# Patient Record
Sex: MALE | Race: WHITE | NOT HISPANIC OR LATINO | Employment: FULL TIME | URBAN - METROPOLITAN AREA
[De-identification: names, ages, dates, MRNs, and addresses within clinical notes are randomized per-mention and may not be internally consistent; named-entity substitution may affect disease eponyms.]

---

## 2022-06-10 ENCOUNTER — TRANSFERRED RECORDS (OUTPATIENT)
Dept: HEALTH INFORMATION MANAGEMENT | Facility: CLINIC | Age: 37
End: 2022-06-10

## 2022-06-28 ENCOUNTER — TRANSFERRED RECORDS (OUTPATIENT)
Dept: HEALTH INFORMATION MANAGEMENT | Facility: CLINIC | Age: 37
End: 2022-06-28

## 2022-06-30 ENCOUNTER — TRANSCRIBE ORDERS (OUTPATIENT)
Dept: OTHER | Age: 37
End: 2022-06-30

## 2022-06-30 DIAGNOSIS — M79.652 MUSCULOSKELETAL THIGH PAIN, LEFT: Primary | ICD-10-CM

## 2022-06-30 DIAGNOSIS — M25.562 LEFT KNEE PAIN: ICD-10-CM

## 2022-07-01 NOTE — TELEPHONE ENCOUNTER
Action July 8, 2022 4:37 PM MT   Action Taken CSS recvd img disc form [facility] and sent to [email or web address] for upload!     Action    Action Taken 7/1/2022 7:20AM KE[facility]     I called 's [facility] phone number and they looked up the pt. They tried to look up the pt but no luck.    I called [facility] Phone: [phone]- they open at 8am and close at 1pm on Fridays.        DIAGNOSIS: mass on left femur   APPOINTMENT DATE: 7/14/2022     NOTES STATUS DETAILS   OFFICE NOTE from referring provider Kay Martin, [facility]    OFFICE NOTE from other specialist [facility] 06/10/2022 - Geoff Cole DO - [facility]    MEDICATION LIST [facility]    LABS     MRI PACS 06/28/2022 - [facility]   XRAYS (IMAGES & REPORTS) PACS 06/10/2022 - [facility]

## 2022-07-11 DIAGNOSIS — R22.42 MASS OF LEFT THIGH: Primary | ICD-10-CM

## 2022-07-14 ENCOUNTER — DOCUMENTATION ONLY (OUTPATIENT)
Dept: ORTHOPEDICS | Facility: CLINIC | Age: 37
End: 2022-07-14

## 2022-07-14 ENCOUNTER — PRE VISIT (OUTPATIENT)
Dept: ORTHOPEDICS | Facility: CLINIC | Age: 37
End: 2022-07-14

## 2022-07-14 ENCOUNTER — OFFICE VISIT (OUTPATIENT)
Dept: ORTHOPEDICS | Facility: CLINIC | Age: 37
End: 2022-07-14
Payer: COMMERCIAL

## 2022-07-14 VITALS — BODY MASS INDEX: 23.49 KG/M2 | HEIGHT: 68 IN | WEIGHT: 155 LBS

## 2022-07-14 DIAGNOSIS — M79.89 SOFT TISSUE MASS: Primary | ICD-10-CM

## 2022-07-14 DIAGNOSIS — M79.652 MUSCULOSKELETAL THIGH PAIN, LEFT: ICD-10-CM

## 2022-07-14 PROCEDURE — 99204 OFFICE O/P NEW MOD 45 MIN: CPT | Performed by: ORTHOPAEDIC SURGERY

## 2022-07-14 RX ORDER — WARFARIN SODIUM 5 MG/1
TABLET ORAL
COMMUNITY
Start: 2022-05-13

## 2022-07-14 RX ORDER — MONTELUKAST SODIUM 10 MG/1
TABLET ORAL
COMMUNITY
Start: 2022-06-15

## 2022-07-14 NOTE — LETTER
7/14/2022         RE: Vlad Calvin  [address]   [place] [address]        Dear Colleague,    Thank you for referring your patient, Vlad Calvin, to the [facility] [place]. Please see a copy of my visit note below.        [facility], Orthopaedic Oncology Surgery Consultation  by Arsh Aguilar M.D.    Vlad Calvin MRN# [id number]    YOB: 1985     Requesting physician: Rian Lam MD, [facility]  Physicians, [facility]            Assessment and Plan:   Assessment:  Soft tissue mass in the quadriceps of left thigh, deep.  Differential diagnosis includes posttraumatic hematoma in setting of patient with chronic anticoagulation and therefore bleeding diathesis, or possible underlying neoplasm.       Plan:  Given the MRI appearance and the fact that his symptoms are improving, and he has definite evidence of ecchymosis, I have recommended that we defer incurring the morbidity of performing a biopsy (which would require interruption of anticoagulation) and instead repeat his MRI imaging study in 1 months time.  This can be done locally with virtual visit follow-up.    I also plan on conferring with our musculoskeletal radiology team about their interpretation of his current MRI study.           History of Present Illness:   37 year old male  chief complaint    L thigh pain x 4-5 weeks.  Thought he tore a muscle, no injury other than slipping on stairs, had swelling, and ecchymosis noted afterwards.  Was given po steroids x 5d.  Reduced his activity.  No better after several weeks and therefore MRI done which showed a soft tissue mass.    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 AVR mechanical in 2013, on warfarin target INR 3-3.5    Also s/p R shoulder dislocation x 1 requiring surgical reconstruction in 3/2021.    Current symptoms:  Problem: Left  "Femur Mass  Onset and duration: 4-5 weeks   Awakens from sleep due to sx's:  No  Precipitating Injury:  No    Other joints or sites painful:  Yes  Fever: No  Appetite change or weight loss: Yes  History of prior or existing cancer: No    Background history:  DX:  1. Bryanna-Danlos Syndrome   2. S/p AVR, mechanical   3. Long term anticoag - INR 2-2.5, warfarin    TREATMENTS:  1. 1987 [facility] OR   2. 1989  3. 2013             Physical Exam:     EXAMINATION pertinent findings:   PSYCH: Pleasant, healthy-appearing, alert, oriented x3, cooperative. Normal mood and affect.  VITAL SIGNS: Height 1.727 m (5' 8\"), weight 70.3 kg (155 lb)..  Reviewed nursing intake notes.   Body mass index is 23.57 kg/m .  RESP: non labored breathing   ABD: benign, soft, non-tender, no acute peritoneal findings  SKIN: grossly normal   LYMPHATIC: grossly normal, no adenopathy, no extremity edema  NEURO: grossly normal , no motor deficits  VASCULAR: satisfactory perfusion of all extremities   MUSCULOSKELETAL:   Normal gait.  Left hip has 0 to 120 degrees flexion as opposed to the right hip which has 0-135 to flexion.  Otherwise rotation, abduction and adduction are symmetric between both hips.    Both knees have full arc of motion 0 to 135 degrees with no evidence of effusion or ligamentous laxity.    Evidence of ecchymosis involving the pretibial and anterolateral aspect of his leg below the knee.    Slight fullness in the mid thigh but no palpable discrete mass.             Data:   All laboratory data reviewed  All imaging studies reviewed by me    MRI studies reviewed demonstrating deep within the quadriceps, maximum 9 cm dimension, with variable signal demonstrating heterogeneity.    DATA for DOCUMENTATION:         Past Medical History:   There is no problem list on file for this patient.    No past medical history on file.    Also see scanned health assessment forms.       Past Surgical History:   No past surgical history " on file.         Social History:     Social History     Socioeconomic History     Marital status: Single     Spouse name: Not on file     Number of children: Not on file     Years of education: Not on file     Highest education level: Not on file   Occupational History     Not on file   Tobacco Use     Smoking status: Not on file     Smokeless tobacco: Not on file   Substance and Sexual Activity     Alcohol use: Not on file     Drug use: Not on file     Sexual activity: Not on file   Other Topics Concern     Not on file   Social History Narrative     Not on file     Social Determinants of Health     Financial Resource Strain: Not on file   Food Insecurity: Not on file   Transportation Needs: Not on file   Physical Activity: Not on file   Stress: Not on file   Social Connections: Not on file   Intimate Partner Violence: Not on file   Housing Stability: Not on file            Family History:     No family history on file.         Medications:     Current Outpatient Medications   Medication Sig     montelukast (SINGULAIR) 10 MG tablet      warfarin ANTICOAGULANT (COUMADIN) 5 MG tablet      No current facility-administered medications for this visit.              Review of Systems:   A comprehensive 10 point review of systems (constitutional, ENT, cardiac, peripheral vascular, lymphatic, respiratory, GI, , Musculoskeletal, skin, Neurological) was performed and found to be negative except as described in this note.     See intake form completed by patient      Arhs Aguilar MD

## 2022-07-14 NOTE — PROGRESS NOTES
Faxed MRI order to [facility] in [facility]. Faxed to [phone] & [phone]. rightfax marked as sent/recieved.     Tina Reinoso ATC

## 2022-07-14 NOTE — PROGRESS NOTES
[facility] Physicians, Orthopaedic Oncology Surgery Consultation  by Arsh Aguilar M.D.    Vlad Calvin MRN# [id number]    YOB: 1985     Requesting physician: Rian Lam MD, [facility]  Physicians, [facility]            Assessment and Plan:   Assessment:  Soft tissue mass in the quadriceps of left thigh, deep.  Differential diagnosis includes posttraumatic hematoma in setting of patient with chronic anticoagulation and therefore bleeding diathesis, or possible underlying neoplasm.       Plan:  Given the MRI appearance and the fact that his symptoms are improving, and he has definite evidence of ecchymosis, I have recommended that we defer incurring the morbidity of performing a biopsy (which would require interruption of anticoagulation) and instead repeat his MRI imaging study in 1 months time.  This can be done locally with virtual visit follow-up.    I also plan on conferring with our musculoskeletal radiology team about their interpretation of his current MRI study.           History of Present Illness:   37 year old male  chief complaint    L thigh pain x 4-5 weeks.  Thought he tore a muscle, no injury other than slipping on stairs, had swelling, and ecchymosis noted afterwards.  Was given po steroids x 5d.  Reduced his activity.  No better after several weeks and therefore MRI done which showed a soft tissue mass.    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 AVR mechanical in 2013, on warfarin target INR 3-3.5    Also s/p R shoulder dislocation x 1 requiring surgical reconstruction in 3/2021.    Current symptoms:  Problem: Left Femur Mass  Onset and duration: 4-5 weeks   Awakens from sleep due to sx's:  No  Precipitating Injury:  No    Other joints or sites painful:  Yes  Fever: No  Appetite change or weight loss: Yes  History of prior or existing cancer: No    Background  "history:  DX:  1. Bryanna-Danlos Syndrome   2. S/p AVR, mechanical   3. Long term anticoag - INR 2-2.5, warfarin    TREATMENTS:  1. 1987 [facility] OR   2. 1989  3. 2013             Physical Exam:     EXAMINATION pertinent findings:   PSYCH: Pleasant, healthy-appearing, alert, oriented x3, cooperative. Normal mood and affect.  VITAL SIGNS: Height 1.727 m (5' 8\"), weight 70.3 kg (155 lb)..  Reviewed nursing intake notes.   Body mass index is 23.57 kg/m .  RESP: non labored breathing   ABD: benign, soft, non-tender, no acute peritoneal findings  SKIN: grossly normal   LYMPHATIC: grossly normal, no adenopathy, no extremity edema  NEURO: grossly normal , no motor deficits  VASCULAR: satisfactory perfusion of all extremities   MUSCULOSKELETAL:   Normal gait.  Left hip has 0 to 120 degrees flexion as opposed to the right hip which has 0-135 to flexion.  Otherwise rotation, abduction and adduction are symmetric between both hips.    Both knees have full arc of motion 0 to 135 degrees with no evidence of effusion or ligamentous laxity.    Evidence of ecchymosis involving the pretibial and anterolateral aspect of his leg below the knee.    Slight fullness in the mid thigh but no palpable discrete mass.             Data:   All laboratory data reviewed  All imaging studies reviewed by me    MRI studies reviewed demonstrating deep within the quadriceps, maximum 9 cm dimension, with variable signal demonstrating heterogeneity.    DATA for DOCUMENTATION:         Past Medical History:   There is no problem list on file for this patient.    No past medical history on file.    Also see scanned health assessment forms.       Past Surgical History:   No past surgical history on file.         Social History:     Social History     Socioeconomic History     Marital status: Single     Spouse name: Not on file     Number of children: Not on file     Years of education: Not on file     Highest education level: Not on file "   Occupational History     Not on file   Tobacco Use     Smoking status: Not on file     Smokeless tobacco: Not on file   Substance and Sexual Activity     Alcohol use: Not on file     Drug use: Not on file     Sexual activity: Not on file   Other Topics Concern     Not on file   Social History Narrative     Not on file     Social Determinants of Health     Financial Resource Strain: Not on file   Food Insecurity: Not on file   Transportation Needs: Not on file   Physical Activity: Not on file   Stress: Not on file   Social Connections: Not on file   Intimate Partner Violence: Not on file   Housing Stability: Not on file            Family History:     No family history on file.         Medications:     Current Outpatient Medications   Medication Sig     montelukast (SINGULAIR) 10 MG tablet      warfarin ANTICOAGULANT (COUMADIN) 5 MG tablet      No current facility-administered medications for this visit.              Review of Systems:   A comprehensive 10 point review of systems (constitutional, ENT, cardiac, peripheral vascular, lymphatic, respiratory, GI, , Musculoskeletal, skin, Neurological) was performed and found to be negative except as described in this note.     See intake form completed by patient

## 2022-07-14 NOTE — LETTER
Return to Work  2022     Seen today: yes    Patient:  Vlad Calvin  :   1985  MRN:     [id number]  Physician: DEBORAH FINNEGAN    Vlad Calvin may return to work on Date: 7/15/2022.      Patient limitations:    Patient has a mass in his thigh causing pain and will only be able perform light duty work functions for period of time until his symptoms resolve.  When his pain resolves, he may resume full-time work as tolerated.        Electronically signed by Deborah Finnegan MD

## 2022-08-15 ENCOUNTER — TRANSFERRED RECORDS (OUTPATIENT)
Dept: HEALTH INFORMATION MANAGEMENT | Facility: CLINIC | Age: 37
End: 2022-08-15

## 2022-09-01 ENCOUNTER — VIRTUAL VISIT (OUTPATIENT)
Dept: ORTHOPEDICS | Facility: CLINIC | Age: 37
End: 2022-09-01
Payer: COMMERCIAL

## 2022-09-01 DIAGNOSIS — S80.12XS: Primary | ICD-10-CM

## 2022-09-01 PROCEDURE — 99212 OFFICE O/P EST SF 10 MIN: CPT | Mod: 95 | Performed by: ORTHOPAEDIC SURGERY

## 2022-09-01 NOTE — PROGRESS NOTES
[facility], Orthopaedic Oncology Surgery Consultation  by Arsh Aguilar M.D.    Vlad Calvin MRN# [id number]    YOB: 1985     Requesting physician: Rian Lam MD, [facility]  [facility]icians, [facility]            Assessment and Plan:   Assessment:  Soft tissue mass, resolving, consistent with posttraumatic hematoma, in the quadriceps of left thigh, deep.    Patient may return to work without restriction.      Plan:  No further evaluation, surveillance or treatment intervention required.  Advised patient to be mindful of his anticoagulation level to be sure it does not become excessive.  Follow-up on an as-needed basis only.    Arsh Aguilar MD  [facility] Professor  Oncology and Adult Reconstructive Surgery  Dept Orthopaedic Surgery, [facility]  [phone] office, [phone] pager  [email or web address]    Total combined visit time and work time before and after clinic visit = 20 min            History of Present Illness:   37 year old male  chief complaint    Patient seen for follow-up evaluation to review MRI examination after initially being seen previously for a swollen mass within his left thigh.  Patient notes that he still has some swelling remaining but it is improved markedly.  He is walking normally without limp.  He is not taking any pain medication.  He feels that he can return to work on a full-time basis without problem.    Background history:  L thigh pain x 4-5 weeks.  Thought he tore a muscle, no injury other than slipping on stairs, had swelling, and ecchymosis noted afterwards.  Was given po steroids x 5d.  Reduced his activity.  No better after several weeks and therefore MRI done which showed a soft tissue mass.    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 AVR mechanical in 2013, on warfarin target INR 3-3.5    Also s/p R shoulder dislocation x 1  requiring surgical reconstruction in 3/2021.    Current symptoms:  Problem: Left Femur Mass  Onset and duration: 4-5 weeks   Awakens from sleep due to sx's:  No  Precipitating Injury:  No    Other joints or sites painful:  Yes  Fever: No  Appetite change or weight loss: Yes  History of prior or existing cancer: No    Background history:  DX:  1. Bryanna-Danlos Syndrome   2. S/p AVR, mechanical   3. Long term anticoag - INR 2-2.5, warfarin    TREATMENTS:  1. 1987 [facility] OR   2. 1989  3. 2013             Physical Exam:     EXAMINATION pertinent findings:   PSYCH: Pleasant, healthy-appearing, alert, oriented x3, cooperative. Normal mood and affect.  VITAL SIGNS: There were no vitals taken for this visit..  Reviewed nursing intake notes.   There is no height or weight on file to calculate BMI.  RESP: non labored breathing   ABD: Deferred due to virtual visit  SKIN: Deferred due to virtual visit  MUSCULOSKELETAL:   Normal gait.  No evidence of limp    MRI examination of the left thigh is reviewed and compared to the previous study.  There is been marked and substantial and near complete resolution of the process and abnormality within his left vastus intermedius muscle.  Findings are consistent with a history of resolving post traumatic hematoma.

## 2022-09-01 NOTE — LETTER
9/1/2022         RE: Vlad Calvin  [address]   [place] [address]        Dear Colleague,    Thank you for referring your patient, Vlad Calvin, to the [facility] [place]. Please see a copy of my visit note below.        [facility], Orthopaedic Oncology Surgery Consultation  by Arsh Aguilar M.D.    Vlad Calvin MRN# [id number]    YOB: 1985     Requesting physician: Rian Lam MD, [facility]  Physicians, [facility]            Assessment and Plan:   Assessment:  Soft tissue mass, resolving, consistent with posttraumatic hematoma, in the quadriceps of left thigh, deep.    Patient may return to work without restriction.      Plan:  No further evaluation, surveillance or treatment intervention required.  Advised patient to be mindful of his anticoagulation level to be sure it does not become excessive.  Follow-up on an as-needed basis only.    Arsh Aguilar MD  [facility] Professor  Oncology and Adult Reconstructive Surgery  Dept Orthopaedic Surgery, [facility]  [phone] office, [phone] pager  [email or web address]    Total combined visit time and work time before and after clinic visit = 20 min            History of Present Illness:   37 year old male  chief complaint    Patient seen for follow-up evaluation to review MRI examination after initially being seen previously for a swollen mass within his left thigh.  Patient notes that he still has some swelling remaining but it is improved markedly.  He is walking normally without limp.  He is not taking any pain medication.  He feels that he can return to work on a full-time basis without problem.    Background history:  L thigh pain x 4-5 weeks.  Thought he tore a muscle, no injury other than slipping on stairs, had swelling, and ecchymosis noted afterwards.  Was given po steroids x 5d.  Reduced his activity.  No better after several weeks and therefore MRI done which  showed a soft tissue mass.    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 AVR mechanical in 2013, on warfarin target INR 3-3.5    Also s/p R shoulder dislocation x 1 requiring surgical reconstruction in 3/2021.    Current symptoms:  Problem: Left Femur Mass  Onset and duration: 4-5 weeks   Awakens from sleep due to sx's:  No  Precipitating Injury:  No    Other joints or sites painful:  Yes  Fever: No  Appetite change or weight loss: Yes  History of prior or existing cancer: No    Background history:  DX:  1. Bryanna-Danlos Syndrome   2. S/p AVR, mechanical   3. Long term anticoag - INR 2-2.5, warfarin    TREATMENTS:  1. 1987 [facility] OR   2. 1989  3. 2013             Physical Exam:     EXAMINATION pertinent findings:   PSYCH: Pleasant, healthy-appearing, alert, oriented x3, cooperative. Normal mood and affect.  VITAL SIGNS: There were no vitals taken for this visit..  Reviewed nursing intake notes.   There is no height or weight on file to calculate BMI.  RESP: non labored breathing   ABD: Deferred due to virtual visit  SKIN: Deferred due to virtual visit  MUSCULOSKELETAL:   Normal gait.  No evidence of limp    MRI examination of the left thigh is reviewed and compared to the previous study.  There is been marked and substantial and near complete resolution of the process and abnormality within his left vastus intermedius muscle.  Findings are consistent with a history of resolving post traumatic hematoma.

## 2022-09-20 ENCOUNTER — DOCUMENTATION ONLY (OUTPATIENT)
Dept: ORTHOPEDICS | Facility: CLINIC | Age: 37
End: 2022-09-20

## 2022-09-20 NOTE — PROGRESS NOTES
Type of Form Received: Workability from [employer]    Form Received (Date) 9.12.22   Form Filled out Yes, date 9.20.22   Placed in provider folder No emailed to Lorrie for e-sig     Once signature from Dr. Aguilar received, will fax to HIM and [phone] [facility]. Need to update pt via [employer] message.     Action 9.20.22 MJ 12:12 PM   Action Taken Received signed form. Faxed to [facility] and [place]. Responded to [employer] message.

## 2022-10-03 ENCOUNTER — HEALTH MAINTENANCE LETTER (OUTPATIENT)
Age: 37
End: 2022-10-03

## 2023-10-22 ENCOUNTER — HEALTH MAINTENANCE LETTER (OUTPATIENT)
Age: 38
End: 2023-10-22

## 2024-12-15 ENCOUNTER — HEALTH MAINTENANCE LETTER (OUTPATIENT)
Age: 39
End: 2024-12-15